# Patient Record
Sex: FEMALE
[De-identification: names, ages, dates, MRNs, and addresses within clinical notes are randomized per-mention and may not be internally consistent; named-entity substitution may affect disease eponyms.]

---

## 2017-03-12 PROBLEM — Z00.00 ENCOUNTER FOR PREVENTIVE HEALTH EXAMINATION: Status: ACTIVE | Noted: 2017-03-12

## 2017-04-10 ENCOUNTER — APPOINTMENT (OUTPATIENT)
Dept: OBGYN | Facility: CLINIC | Age: 57
End: 2017-04-10

## 2017-04-10 VITALS
SYSTOLIC BLOOD PRESSURE: 132 MMHG | DIASTOLIC BLOOD PRESSURE: 87 MMHG | HEIGHT: 64 IN | WEIGHT: 195 LBS | BODY MASS INDEX: 33.29 KG/M2

## 2017-04-10 DIAGNOSIS — R23.2 FLUSHING: ICD-10-CM

## 2017-04-10 DIAGNOSIS — I10 ESSENTIAL (PRIMARY) HYPERTENSION: ICD-10-CM

## 2017-04-10 DIAGNOSIS — Z91.89 OTHER SPECIFIED PERSONAL RISK FACTORS, NOT ELSEWHERE CLASSIFIED: ICD-10-CM

## 2017-04-10 DIAGNOSIS — Z87.891 PERSONAL HISTORY OF NICOTINE DEPENDENCE: ICD-10-CM

## 2017-04-10 DIAGNOSIS — Z82.49 FAMILY HISTORY OF ISCHEMIC HEART DISEASE AND OTHER DISEASES OF THE CIRCULATORY SYSTEM: ICD-10-CM

## 2017-04-10 DIAGNOSIS — F15.90 OTHER STIMULANT USE, UNSPECIFIED, UNCOMPLICATED: ICD-10-CM

## 2017-04-10 DIAGNOSIS — Z80.3 FAMILY HISTORY OF MALIGNANT NEOPLASM OF BREAST: ICD-10-CM

## 2017-04-10 LAB — HEMOCCULT SP1 STL QL: NEGATIVE

## 2017-04-10 RX ORDER — LISINOPRIL AND HYDROCHLOROTHIAZIDE TABLETS 20; 25 MG/1; MG/1
20-25 TABLET ORAL
Qty: 90 | Refills: 0 | Status: ACTIVE | COMMUNITY
Start: 2016-11-21

## 2017-04-12 LAB
C TRACH RRNA SPEC QL NAA+PROBE: NORMAL
HPV HIGH+LOW RISK DNA PNL CVX: NEGATIVE
N GONORRHOEA RRNA SPEC QL NAA+PROBE: NORMAL
SOURCE TP AMPLIFICATION: NORMAL

## 2017-04-14 LAB — CYTOLOGY CVX/VAG DOC THIN PREP: NORMAL

## 2017-06-13 ENCOUNTER — APPOINTMENT (OUTPATIENT)
Dept: OBGYN | Facility: CLINIC | Age: 57
End: 2017-06-13

## 2017-06-15 ENCOUNTER — RESULT REVIEW (OUTPATIENT)
Age: 57
End: 2017-06-15

## 2017-06-16 ENCOUNTER — MESSAGE (OUTPATIENT)
Age: 57
End: 2017-06-16

## 2018-04-20 ENCOUNTER — APPOINTMENT (OUTPATIENT)
Dept: OBGYN | Facility: CLINIC | Age: 58
End: 2018-04-20
Payer: COMMERCIAL

## 2018-04-20 VITALS
WEIGHT: 180 LBS | DIASTOLIC BLOOD PRESSURE: 78 MMHG | HEIGHT: 64 IN | SYSTOLIC BLOOD PRESSURE: 128 MMHG | BODY MASS INDEX: 30.73 KG/M2

## 2018-04-20 DIAGNOSIS — Z78.9 OTHER SPECIFIED HEALTH STATUS: ICD-10-CM

## 2018-04-20 LAB — HEMOCCULT SP1 STL QL: NEGATIVE

## 2018-04-20 PROCEDURE — 82270 OCCULT BLOOD FECES: CPT

## 2018-04-20 PROCEDURE — 99396 PREV VISIT EST AGE 40-64: CPT

## 2018-04-20 RX ORDER — TERBINAFINE HYDROCHLORIDE 250 MG/1
250 TABLET ORAL
Refills: 0 | Status: DISCONTINUED | COMMUNITY
Start: 2017-04-10 | End: 2018-04-20

## 2019-06-13 ENCOUNTER — APPOINTMENT (OUTPATIENT)
Dept: OBGYN | Facility: CLINIC | Age: 59
End: 2019-06-13
Payer: COMMERCIAL

## 2019-06-13 VITALS
DIASTOLIC BLOOD PRESSURE: 82 MMHG | WEIGHT: 186 LBS | HEIGHT: 64 IN | SYSTOLIC BLOOD PRESSURE: 130 MMHG | BODY MASS INDEX: 31.76 KG/M2

## 2019-06-13 DIAGNOSIS — Z98.890 OTHER SPECIFIED POSTPROCEDURAL STATES: ICD-10-CM

## 2019-06-13 LAB — HEMOCCULT SP1 STL QL: NEGATIVE

## 2019-06-13 PROCEDURE — 82270 OCCULT BLOOD FECES: CPT

## 2019-06-13 PROCEDURE — 99396 PREV VISIT EST AGE 40-64: CPT

## 2019-06-13 NOTE — PHYSICAL EXAM
[Awake] : awake [Alert] : alert [Soft] : soft [Oriented x3] : oriented to person, place, and time [Normal] : uterus [No Bleeding] : there was no active vaginal bleeding [Uterine Adnexae] : were not tender and not enlarged [RRR, No Murmurs] : RRR, no murmurs [CTAB] : CTAB [LAD] : no lymphadenopathy [Acute Distress] : no acute distress [Goiter] : no goiter [Thyroid Nodule] : no thyroid nodule [Mass] : no breast mass [Nipple Discharge] : no nipple discharge [Axillary LAD] : no axillary lymphadenopathy [Tender] : non tender

## 2019-06-13 NOTE — COUNSELING
[Nutrition] : nutrition [Exercise] : exercise [Breast Self Exam] : breast self exam [Vitamins/Supplements] : vitamins/supplements [FreeTextEntry2] : Call if ever any vaginal bleeding for it is never normal

## 2019-06-13 NOTE — HISTORY OF PRESENT ILLNESS
[Regular Exercise] : regular exercise [Good] : being in good health [1 Year Ago] : 1 year ago [Healthy Diet] : a healthy diet [___ Servings of Dairy/Day] : [unfilled] servings of dairy foods per day [Weight Concerns] : weight concens [3 or more times/wk] :  3 or more times per week [Walking] : walking [Serious Attempts To Lose] : serious weight loss attempts [Recent Weight Loss (___ Lbs)] : recent [unfilled] ~Ulbs weight loss [Last Bone Density ___] : Last bone density studies [unfilled] [Last Mammogram ___] : Last Mammogram was [unfilled] [Last Colonoscopy ___] : Last colonoscopy [unfilled] [Last Pap ___] : Last cervical pap smear was [unfilled] [Performed Within 5 Years] : lipid profile performed within the past five years [Performed Last Year] : thyroid function test performed last year [Hypertension] : hypertension [Sedentary Lifestyle] : sedentary lifestyle [FH Cardiovascular Disease] : family history of cardiovascular disease [Abnormal Cervical Cytology] : abnormal cervical cytology [HPV Positive] : positive screening for human papilloma virus [Previous Colon Polyps] : previous colon polyps [Postmenopausal] : is postmenopausal [Osteoporosis Risk Factors] : osteoporosis risk factors [HPV Vaccine NA Due to Age] : HPV vaccine not available to patient due to age [Age of First Sexual [place] ___] : became sexually active at the age of [unfilled] [Male ___] : [unfilled] male [NA] : N/A [Current] : risk screening reviewed and current [Low HDL] : no low HDL cholesterol [Diabetes] : no diabetes [High LDL] : no high LDL cholesterol [Tobacco Use] : no tobacco use [Stress] : no stress [Hormone Therapy] : no hormone therapy [Illicit Drug Use] : no illicit drug use [Previous Breast Cancer] : no previous breast cancer [Inflammatory Bowel Disease] : no inflammatory bowel disease [In Utero BRITTANI Exposure] : no diethylstilbestrol (BRITTANI) exposure in utero [Pregnancy History] : denies prior pregnancies [de-identified] : tries to walk 3 miles a day [de-identified] : Menopause age 49 [Sexually Active] : is not sexually active [de-identified] : last coitus 7 years ago, when

## 2019-07-08 ENCOUNTER — RESULT REVIEW (OUTPATIENT)
Age: 59
End: 2019-07-08

## 2019-07-12 PROBLEM — Z98.890 S/P RIGHT BREAST BIOPSY: Status: ACTIVE | Noted: 2019-07-12

## 2019-07-16 ENCOUNTER — APPOINTMENT (OUTPATIENT)
Dept: OBGYN | Facility: CLINIC | Age: 59
End: 2019-07-16
Payer: COMMERCIAL

## 2019-07-16 PROCEDURE — 77085 DXA BONE DENSITY AXL VRT FX: CPT

## 2020-01-06 ENCOUNTER — RESULT CHARGE (OUTPATIENT)
Age: 60
End: 2020-01-06

## 2020-07-10 ENCOUNTER — APPOINTMENT (OUTPATIENT)
Dept: OBGYN | Facility: CLINIC | Age: 60
End: 2020-07-10
Payer: COMMERCIAL

## 2020-07-10 VITALS
DIASTOLIC BLOOD PRESSURE: 88 MMHG | WEIGHT: 172 LBS | SYSTOLIC BLOOD PRESSURE: 142 MMHG | BODY MASS INDEX: 29.37 KG/M2 | HEIGHT: 64 IN

## 2020-07-10 DIAGNOSIS — Z01.419 ENCOUNTER FOR GYNECOLOGICAL EXAMINATION (GENERAL) (ROUTINE) W/OUT ABNORMAL FINDINGS: ICD-10-CM

## 2020-07-10 PROCEDURE — 99396 PREV VISIT EST AGE 40-64: CPT

## 2020-07-10 RX ORDER — B-COMPLEX WITH VITAMIN C
TABLET ORAL
Refills: 0 | Status: COMPLETED | COMMUNITY
End: 2020-07-10

## 2020-07-10 NOTE — HISTORY OF PRESENT ILLNESS
[Good] : being in good health [1 Year Ago] : 1 year ago [Weight Concerns] : weight concens [Healthy Diet] : a healthy diet [Regular Exercise] : regular exercise [___ Servings of Dairy/Day] : [unfilled] servings of dairy foods per day [3 or more times/wk] :  3 or more times per week [Recent Weight Loss (___ Lbs)] : recent [unfilled] ~Ulbs weight loss [Walking] : walking [Serious Attempts To Lose] : serious weight loss attempts [Current] : metabolic screening reviewed and current [Last Colonoscopy ___] : Last colonoscopy [unfilled] [Last Mammogram ___] : Last Mammogram was [unfilled] [Last Bone Density ___] : Last bone density studies [unfilled] [Performed Within 5 Years] : lipid profile performed within the past five years [Last Pap ___] : Last cervical pap smear was [unfilled] [Hypertension] : hypertension [Performed Last Year] : thyroid function test performed last year [FH Cardiovascular Disease] : family history of cardiovascular disease [Sedentary Lifestyle] : sedentary lifestyle [Abnormal Cervical Cytology] : abnormal cervical cytology [HPV Positive] : positive screening for human papilloma virus [Previous Colon Polyps] : previous colon polyps [Osteoporosis Risk Factors] : osteoporosis risk factors [Postmenopausal] : is postmenopausal [HPV Vaccine NA Due to Age] : HPV vaccine not available to patient due to age [Age of First Sexual [place] ___] : became sexually active at the age of [unfilled] [NA] : N/A [Male ___] : [unfilled] male [High LDL] : no high LDL cholesterol [Diabetes] : no diabetes [Low HDL] : no low HDL cholesterol [Tobacco Use] : no tobacco use [Illicit Drug Use] : no illicit drug use [Stress] : no stress [Previous Breast Cancer] : no previous breast cancer [In Utero BRITTANI Exposure] : no diethylstilbestrol (BRITTANI) exposure in utero [Hormone Therapy] : no hormone therapy [Inflammatory Bowel Disease] : no inflammatory bowel disease [Pregnancy History] : denies prior pregnancies [de-identified] : Menopause age 49 [de-identified] : tries to walk 3 miles a day [Sexually Active] : is not sexually active [de-identified] : last coitus 7 years ago, when

## 2020-07-10 NOTE — PHYSICAL EXAM
[Awake] : awake [Alert] : alert [Soft] : soft [Oriented x3] : oriented to person, place, and time [Normal] : cervix [No Bleeding] : there was no active vaginal bleeding [RRR, No Murmurs] : RRR, no murmurs [Uterine Adnexae] : were not tender and not enlarged [CTAB] : CTAB [Acute Distress] : no acute distress [Thyroid Nodule] : no thyroid nodule [LAD] : no lymphadenopathy [Goiter] : no goiter [Mass] : no breast mass [Tender] : non tender [Axillary LAD] : no axillary lymphadenopathy [Nipple Discharge] : no nipple discharge [FreeTextEntry9] : defer rectasl colonoscopy last month

## 2020-07-10 NOTE — COUNSELING
[Nutrition] : nutrition [Breast Self Exam] : breast self exam [Exercise] : exercise [Vitamins/Supplements] : vitamins/supplements [FreeTextEntry2] : Call if ever any vaginal bleeding for it is never normal

## 2020-07-13 LAB — HPV HIGH+LOW RISK DNA PNL CVX: NOT DETECTED

## 2020-07-18 LAB — CYTOLOGY CVX/VAG DOC THIN PREP: ABNORMAL

## 2020-12-23 PROBLEM — Z01.419 ENCOUNTER FOR GYNECOLOGICAL EXAMINATION WITH PAPANICOLAOU SMEAR OF CERVIX: Status: RESOLVED | Noted: 2017-04-10 | Resolved: 2020-12-23

## 2022-02-14 ENCOUNTER — APPOINTMENT (OUTPATIENT)
Dept: OBGYN | Facility: CLINIC | Age: 62
End: 2022-02-14
Payer: COMMERCIAL

## 2022-02-14 VITALS — SYSTOLIC BLOOD PRESSURE: 122 MMHG | DIASTOLIC BLOOD PRESSURE: 82 MMHG | HEIGHT: 64 IN

## 2022-02-14 DIAGNOSIS — Z12.11 ENCOUNTER FOR SCREENING FOR MALIGNANT NEOPLASM OF COLON: ICD-10-CM

## 2022-02-14 PROCEDURE — 99396 PREV VISIT EST AGE 40-64: CPT

## 2022-02-14 NOTE — HISTORY OF PRESENT ILLNESS
[unknown] : Patient is unsure of the date of her LMP [No] : No [FreeTextEntry1] : NO URINARY COMPLAINTS OR PMB.   NO BREAST COMPLAINTS, BUT DUE FOR 6 MONTH F/U TO BREAST US 5/2022.  HAD 12/2021 AT [facility] IN [facility].  LIVES IN [facility].  \par \par WALKS 3 MILES DAILY, DOES CHAIR YOGA.  DOES NOT TAKE VITAMIN D REGULARLY.  FOR F/U DEXA.\par \par DISCUSSED PRECAUTIONS AGAINST COVID19, INCLUDING MASK WEARING, SOCIAL DISTANCING AND HAND WASHING.\par HAD COVID INFECTION 3/2020, VACCINATED X 3 NOW.  \par \par MANAGEMENT OF PAP SMEAR AND D/W PATIENT.  OPTIONS INCLUDE YEARLY PAP VS. Q 3 YEARS.  RISKS OF OVER-TREATMENT OF BENIGN DISEASE VERSUS "MISSING" CERVICAL DISEASE DISCUSSED.\par  [Mammogramdate] : 12/2021 [PapSmeardate] : 07/10/2020 [TextBox_31] : negative [BoneDensityDate] : 07/16/2019 [HPVDate] : 07/10/2020 [TextBox_78] : negative [Previously active] : previously active [FreeTextEntry2] : , NOT DATING

## 2022-02-14 NOTE — PHYSICAL EXAM
[Appropriately responsive] : appropriately responsive [Alert] : alert [No Acute Distress] : no acute distress [Soft] : soft [Non-tender] : non-tender [Non-distended] : non-distended [No HSM] : No HSM [No Lesions] : no lesions [No Mass] : no mass [Oriented x3] : oriented x3 [Examination Of The Breasts] : a normal appearance [No Masses] : no breast masses were palpable [Vulvar Atrophy] : vulvar atrophy [Labia Majora] : normal [Labia Minora] : normal [Normal] : normal [Uterine Adnexae] : normal [No Tenderness] : no tenderness [Nl Sphincter Tone] : normal sphincter tone [FreeTextEntry9] : GUAIAC NEGATIVE

## 2022-08-01 ENCOUNTER — NON-APPOINTMENT (OUTPATIENT)
Age: 62
End: 2022-08-01

## 2023-01-09 ENCOUNTER — NON-APPOINTMENT (OUTPATIENT)
Age: 63
End: 2023-01-09

## 2023-02-28 ENCOUNTER — APPOINTMENT (OUTPATIENT)
Dept: OBGYN | Facility: CLINIC | Age: 63
End: 2023-02-28
Payer: COMMERCIAL

## 2023-02-28 ENCOUNTER — NON-APPOINTMENT (OUTPATIENT)
Age: 63
End: 2023-02-28

## 2023-02-28 VITALS
WEIGHT: 180 LBS | HEIGHT: 64 IN | BODY MASS INDEX: 30.73 KG/M2 | SYSTOLIC BLOOD PRESSURE: 130 MMHG | DIASTOLIC BLOOD PRESSURE: 70 MMHG

## 2023-02-28 DIAGNOSIS — M81.0 AGE-RELATED OSTEOPOROSIS W/OUT CURRENT PATHOLOGICAL FRACTURE: ICD-10-CM

## 2023-02-28 PROCEDURE — 99396 PREV VISIT EST AGE 40-64: CPT

## 2023-02-28 NOTE — HISTORY OF PRESENT ILLNESS
[FreeTextEntry1] : BREAST SCREENING INTERVALS REVIEWED, BASED ON EVIDENCE BASED RECOMMENDATIONS FOR BREAST CANCER IN THE [place] FOR Patient's AGE, PERSONAL AND FAMILY CANCER HISTORIES.  DISCUSSED UTILITY OF BREAST MAMMOGRAM, SONOGRAM AND MRI.   RECOMMENDED APPROPRIATE BREAST TESTING.\par \par MANAGEMENT OF PAP SMEAR AND D/W PATIENT.  OPTIONS INCLUDE YEARLY PAP VS. Q 3 YEARS.  RISKS OF OVER-TREATMENT OF BENIGN DISEASE VERSUS "MISSING" CERVICAL DISEASE DISCUSSED.\par \par Results of bone density reviewed with patient.  We discussed diet, exercise, calcium, vitamin D, smoking cessation, family history.  Fall precautions given. Discussed anti-resorptive agents, hormones and hormone agonists and expectant management.\par RESULTS FROM [facility], 7/2022, OSTEOPENIA AT LUMBAR VERTEBRAE WITH SIGNIFICANT LOSS SINCE 2019.  NEW MACHINE.\par FEMORAL NECK NORMAL.\par DOING CHAIR YOGA, "SENIOR SHAPE WITH MAURICE," RE-STARTED VITAMIN D3. [TextBox_4] : Annual [Mammogramdate] : 1/3/23 [TextBox_19] : br 3   Biopsy 7/8/19 [TextBox_25] : br 3 [PapSmeardate] : 7/10/20 [TextBox_31] : atrophic [BoneDensityDate] : 7/20/22 [TextBox_37] : osteopenia  [ColonoscopyDate] : 7/2020 [LMPDate] : age 49 [Previously active] : previously active [No] : No [FreeTextEntry2] : , NOT DATING

## 2023-02-28 NOTE — PHYSICAL EXAM
[Chaperone Present] : A chaperone was present in the examining room during all aspects of the physical examination [FreeTextEntry1] : JUDE VAUGHAN [Appropriately responsive] : appropriately responsive [Alert] : alert [No Acute Distress] : no acute distress [Soft] : soft [Non-tender] : non-tender [Non-distended] : non-distended [No HSM] : No HSM [No Lesions] : no lesions [No Mass] : no mass [Oriented x3] : oriented x3 [Examination Of The Breasts] : a normal appearance [No Masses] : no breast masses were palpable [Vulvar Atrophy] : vulvar atrophy [Labia Majora] : normal [Labia Minora] : normal [Normal] : normal [Uterine Adnexae] : normal [No Tenderness] : no tenderness [Nl Sphincter Tone] : normal sphincter tone [FreeTextEntry9] : GUAIAC CARD NOT AVAILABLE

## 2023-03-02 LAB — HPV HIGH+LOW RISK DNA PNL CVX: NOT DETECTED

## 2023-03-06 LAB — CYTOLOGY CVX/VAG DOC THIN PREP: ABNORMAL

## 2023-06-08 ENCOUNTER — NON-APPOINTMENT (OUTPATIENT)
Age: 63
End: 2023-06-08

## 2023-06-08 DIAGNOSIS — N63.0 UNSPECIFIED LUMP IN UNSPECIFIED BREAST: ICD-10-CM

## 2024-03-26 ENCOUNTER — APPOINTMENT (OUTPATIENT)
Dept: OBGYN | Facility: CLINIC | Age: 64
End: 2024-03-26
Payer: COMMERCIAL

## 2024-03-26 VITALS
HEIGHT: 64 IN | SYSTOLIC BLOOD PRESSURE: 140 MMHG | DIASTOLIC BLOOD PRESSURE: 72 MMHG | WEIGHT: 183 LBS | BODY MASS INDEX: 31.24 KG/M2

## 2024-03-26 DIAGNOSIS — Z01.419 ENCOUNTER FOR GYNECOLOGICAL EXAMINATION (GENERAL) (ROUTINE) W/OUT ABNORMAL FINDINGS: ICD-10-CM

## 2024-03-26 DIAGNOSIS — Z12.39 ENCOUNTER FOR OTHER SCREENING FOR MALIGNANT NEOPLASM OF BREAST: ICD-10-CM

## 2024-03-26 DIAGNOSIS — N60.19 DIFFUSE CYSTIC MASTOPATHY OF UNSPECIFIED BREAST: ICD-10-CM

## 2024-03-26 DIAGNOSIS — R92.30 DENSE BREASTS, UNSPECIFIED: ICD-10-CM

## 2024-03-26 PROCEDURE — 99396 PREV VISIT EST AGE 40-64: CPT

## 2024-03-26 NOTE — HISTORY OF PRESENT ILLNESS
[FreeTextEntry1] : BREAST SCREENING INTERVALS REVIEWED, BASED ON EVIDENCE BASED RECOMMENDATIONS FOR BREAST CANCER IN THE [place] FOR Patient's AGE, PERSONAL AND FAMILY CANCER HISTORIES.  DISCUSSED UTILITY OF BREAST MAMMOGRAM, SONOGRAM AND MRI.   RECOMMENDED APPROPRIATE BREAST TESTING. FOR 6 MONTH F/U (7/2024) BREAST CYST, INISPISSATED.  ALSO DENSE BREASTS, F/U SONOGRAM WITH ANNUAL MAMMOGRAM.  MANAGEMENT OF PAP SMEAR AND D/W PATIENT.  OPTIONS INCLUDE YEARLY PAP VS. Q 3 YEARS.  RISKS OF OVER-TREATMENT OF BENIGN DISEASE VERSUS "MISSING" CERVICAL DISEASE DISCUSSED.  DISCUSSED BMI, RISKS OF OBESITY, METHODS OF WEIGHT LOSS INCLUDING VARIOUS DIETS AND SOCIAL SUPPORT FOR DIET, EXERCISE, WEIGHT LOSS MEDICATIONS  DOES WALK EVERY DAY.  NO LONGER SKIING DUE TO KNEE PROBLEMS. [Mammogramdate] : 1/4/24 [TextBox_19] : BR 2 [TextBox_31] : ATROPHIC  [PapSmeardate] : 2/28/23 [TextBox_25] : BR 3  BIOPSY  7/8/19 [TextBox_37] : OSTEOPENIA  [BoneDensityDate] : 7/20/22 [ColonoscopyDate] : 7/2020 [Previously active] : previously active [No] : No [FreeTextEntry2] : , NOT DATING

## 2024-03-26 NOTE — PHYSICAL EXAM
[Appropriately responsive] : appropriately responsive [Alert] : alert [No Acute Distress] : no acute distress [Soft] : soft [Non-tender] : non-tender [Non-distended] : non-distended [No HSM] : No HSM [No Lesions] : no lesions [No Mass] : no mass [Oriented x3] : oriented x3 [Examination Of The Breasts] : a normal appearance [No Masses] : no breast masses were palpable [Vulvar Atrophy] : vulvar atrophy [Labia Majora] : normal [Labia Minora] : normal [Normal] : normal [Uterine Adnexae] : non-palpable [No Tenderness] : no tenderness [Nl Sphincter Tone] : normal sphincter tone [FreeTextEntry9] : GUAIAC NEGATIVE [FreeTextEntry6] : LIMITED BY HABITUS

## 2024-07-08 ENCOUNTER — APPOINTMENT (OUTPATIENT)
Age: 64
End: 2024-07-08
Payer: COMMERCIAL

## 2024-07-08 PROCEDURE — 99203 OFFICE O/P NEW LOW 30 MIN: CPT

## 2024-07-09 ENCOUNTER — NON-APPOINTMENT (OUTPATIENT)
Age: 64
End: 2024-07-09

## 2024-11-11 ENCOUNTER — APPOINTMENT (OUTPATIENT)
Age: 64
End: 2024-11-11

## 2024-12-23 ENCOUNTER — NON-APPOINTMENT (OUTPATIENT)
Age: 64
End: 2024-12-23

## 2025-04-02 ENCOUNTER — APPOINTMENT (OUTPATIENT)
Dept: OBGYN | Facility: CLINIC | Age: 65
End: 2025-04-02
Payer: MEDICARE

## 2025-04-02 VITALS
HEIGHT: 64 IN | WEIGHT: 185 LBS | SYSTOLIC BLOOD PRESSURE: 124 MMHG | BODY MASS INDEX: 31.58 KG/M2 | DIASTOLIC BLOOD PRESSURE: 72 MMHG

## 2025-04-02 DIAGNOSIS — Z12.39 ENCOUNTER FOR OTHER SCREENING FOR MALIGNANT NEOPLASM OF BREAST: ICD-10-CM

## 2025-04-02 DIAGNOSIS — M81.0 AGE-RELATED OSTEOPOROSIS W/OUT CURRENT PATHOLOGICAL FRACTURE: ICD-10-CM

## 2025-04-02 DIAGNOSIS — R92.30 DENSE BREASTS, UNSPECIFIED: ICD-10-CM

## 2025-04-02 DIAGNOSIS — Z01.419 ENCOUNTER FOR GYNECOLOGICAL EXAMINATION (GENERAL) (ROUTINE) W/OUT ABNORMAL FINDINGS: ICD-10-CM

## 2025-04-02 DIAGNOSIS — Z78.0 ASYMPTOMATIC MENOPAUSAL STATE: ICD-10-CM

## 2025-04-02 PROCEDURE — G0101: CPT

## 2025-04-02 PROCEDURE — 99397 PER PM REEVAL EST PAT 65+ YR: CPT | Mod: GY

## 2025-04-02 RX ORDER — THIAMINE HCL 100 MG
TABLET ORAL
Refills: 0 | Status: ACTIVE | COMMUNITY

## 2025-04-02 RX ORDER — MELATONIN 3 MG
TABLET ORAL
Refills: 0 | Status: ACTIVE | COMMUNITY

## 2025-04-02 RX ORDER — AMLODIPINE BESYLATE 2.5 MG/1
2.5 TABLET ORAL
Refills: 0 | Status: ACTIVE | COMMUNITY

## 2025-04-02 RX ORDER — PSYLLIUM HUSK 0.4 G
CAPSULE ORAL
Refills: 0 | Status: ACTIVE | COMMUNITY

## 2025-04-14 ENCOUNTER — APPOINTMENT (OUTPATIENT)
Dept: RADIOLOGY | Facility: CLINIC | Age: 65
End: 2025-04-14
Payer: MEDICARE

## 2025-04-14 PROCEDURE — 77080 DXA BONE DENSITY AXIAL: CPT

## 2025-05-10 ENCOUNTER — RESULT REVIEW (OUTPATIENT)
Age: 65
End: 2025-05-10

## 2025-05-10 ENCOUNTER — NON-APPOINTMENT (OUTPATIENT)
Age: 65
End: 2025-05-10